# Patient Record
Sex: FEMALE | Race: BLACK OR AFRICAN AMERICAN | NOT HISPANIC OR LATINO | URBAN - METROPOLITAN AREA
[De-identification: names, ages, dates, MRNs, and addresses within clinical notes are randomized per-mention and may not be internally consistent; named-entity substitution may affect disease eponyms.]

---

## 2019-11-04 ENCOUNTER — OFFICE VISIT (OUTPATIENT)
Dept: UROLOGY | Facility: CLINIC | Age: 64
End: 2019-11-04
Payer: MEDICARE

## 2019-11-04 VITALS
HEIGHT: 65 IN | BODY MASS INDEX: 29.49 KG/M2 | WEIGHT: 177 LBS | SYSTOLIC BLOOD PRESSURE: 109 MMHG | HEART RATE: 96 BPM | DIASTOLIC BLOOD PRESSURE: 69 MMHG | RESPIRATION RATE: 18 BRPM

## 2019-11-04 DIAGNOSIS — R82.71 ASYMPTOMATIC BACTERIURIA: ICD-10-CM

## 2019-11-04 DIAGNOSIS — N31.9 NEUROGENIC BLADDER: Primary | ICD-10-CM

## 2019-11-04 PROCEDURE — 3008F BODY MASS INDEX DOCD: CPT | Mod: CPTII,S$GLB,, | Performed by: SPECIALIST

## 2019-11-04 PROCEDURE — 3008F PR BODY MASS INDEX (BMI) DOCUMENTED: ICD-10-PCS | Mod: CPTII,S$GLB,, | Performed by: SPECIALIST

## 2019-11-04 PROCEDURE — 99213 OFFICE O/P EST LOW 20 MIN: CPT | Mod: S$GLB,,, | Performed by: SPECIALIST

## 2019-11-04 PROCEDURE — 1101F PR PT FALLS ASSESS DOC 0-1 FALLS W/OUT INJ PAST YR: ICD-10-PCS | Mod: CPTII,S$GLB,, | Performed by: SPECIALIST

## 2019-11-04 PROCEDURE — 99213 PR OFFICE/OUTPT VISIT, EST, LEVL III, 20-29 MIN: ICD-10-PCS | Mod: S$GLB,,, | Performed by: SPECIALIST

## 2019-11-04 PROCEDURE — 1101F PT FALLS ASSESS-DOCD LE1/YR: CPT | Mod: CPTII,S$GLB,, | Performed by: SPECIALIST

## 2019-11-04 RX ORDER — CYCLOBENZAPRINE HCL 10 MG
10 TABLET ORAL NIGHTLY PRN
Refills: 5 | COMMUNITY
Start: 2019-09-04 | End: 2020-11-05

## 2019-11-04 RX ORDER — IBUPROFEN 600 MG/1
600 TABLET ORAL EVERY 6 HOURS PRN
COMMUNITY
End: 2021-05-12

## 2019-11-04 RX ORDER — MONTELUKAST SODIUM 10 MG/1
10 TABLET ORAL NIGHTLY
COMMUNITY

## 2019-11-04 RX ORDER — METFORMIN HYDROCHLORIDE 500 MG/1
500 TABLET ORAL DAILY
Refills: 1 | COMMUNITY
Start: 2019-09-04

## 2019-11-04 RX ORDER — NAPROXEN SODIUM 220 MG/1
81 TABLET, FILM COATED ORAL DAILY
COMMUNITY

## 2019-11-04 RX ORDER — UBIDECARENONE 30 MG
30 CAPSULE ORAL 3 TIMES DAILY
COMMUNITY

## 2019-11-04 RX ORDER — SERTRALINE HYDROCHLORIDE 50 MG/1
50 TABLET, FILM COATED ORAL DAILY
COMMUNITY

## 2019-11-04 RX ORDER — MELOXICAM 7.5 MG/1
7.5 TABLET ORAL DAILY
COMMUNITY
End: 2021-05-12

## 2019-11-04 NOTE — ASSESSMENT & PLAN NOTE
Patient with neurogenic bladder from prolonged diabetes doing clean intermittent catheterization with monitoring every 6 months

## 2019-11-04 NOTE — PROGRESS NOTES
Subjective:       Patient ID: Lady Sargent is a 64 y.o. female.    Chief Complaint: Other (6mth f/u)      HPI:  64-year-old female with diabetics.  The she has a very high capacity bladder.  She is not able to empty on on the cord.  Would recommend clean intermittent catheterization and she has been doing it as much as possible.  She gets out large amounts of volumes.    She has had a history of gross hematuria we did a workup and was negative.  Recently she called me reporting that she had blood in her urine again.  When I questioned her further seems like she had held her bladder and was it was distended to maximum.    She has asymptomatic bacteriuria as expected from stone was not emptying her bladder very well as well as some was using intermittent catheters.    She has a history of prolapse that we repaired a few years ago is no evidence of recurrence.    Past Medical History:   Past Medical History:   Diagnosis Date    Diabetes mellitus     Hypotension        Past Surgical Historical:   Past Surgical History:   Procedure Laterality Date    HYSTERECTOMY      complete        Medications:   Medication List with Changes/Refills   Current Medications    ASPIRIN 81 MG CHEW    Take 81 mg by mouth once daily.    CO-ENZYME Q-10 30 MG CAPSULE    Take 30 mg by mouth 3 (three) times daily.    CYCLOBENZAPRINE (FLEXERIL) 10 MG TABLET    Take 10 mg by mouth nightly as needed.    IBUPROFEN (ADVIL,MOTRIN) 600 MG TABLET    Take 600 mg by mouth every 6 (six) hours as needed for Pain.    MELOXICAM (MOBIC) 7.5 MG TABLET    Take 7.5 mg by mouth once daily.    METFORMIN (GLUCOPHAGE) 500 MG TABLET    Take 500 mg by mouth 2 (two) times daily.    MONTELUKAST (SINGULAIR) 10 MG TABLET    Take 10 mg by mouth every evening.    MULTIVITAMIN CAPSULE    Take 1 capsule by mouth once daily.    SERTRALINE (ZOLOFT) 50 MG TABLET    Take 50 mg by mouth once daily.        Past Social History:   Social History     Socioeconomic History     Marital status:      Spouse name: Not on file    Number of children: Not on file    Years of education: Not on file    Highest education level: Not on file   Occupational History    Not on file   Social Needs    Financial resource strain: Not on file    Food insecurity:     Worry: Not on file     Inability: Not on file    Transportation needs:     Medical: Not on file     Non-medical: Not on file   Tobacco Use    Smoking status: Never Smoker    Smokeless tobacco: Never Used   Substance and Sexual Activity    Alcohol use: Not Currently    Drug use: Never    Sexual activity: Not on file   Lifestyle    Physical activity:     Days per week: Not on file     Minutes per session: Not on file    Stress: Not on file   Relationships    Social connections:     Talks on phone: Not on file     Gets together: Not on file     Attends Gnosticism service: Not on file     Active member of club or organization: Not on file     Attends meetings of clubs or organizations: Not on file     Relationship status: Not on file   Other Topics Concern    Not on file   Social History Narrative    Not on file       Allergies: Review of patient's allergies indicates:  No Known Allergies     Family History:   Family History   Problem Relation Age of Onset    No Known Problems Father     No Known Problems Mother     Cancer Maternal Aunt     Bone cancer Maternal Aunt     Cancer Maternal Grandmother     Bone cancer Maternal Grandmother         Review of Systems:   systems reviewed and notable for neurogenic bladder symptoms  All other systems were reviewed Neg except as stated in the HPI      Physical Exam:  General: A&Ox3. No apparent distress. No deformities.  Neck: No masses. Normal thyroid.  Lungs: normal inspiration. No use of accessory muscles.  Heart: normal pulse. No arrhythmias.  Abdomen: Soft. NT. ND. No masses. No hernias. No hepatosplenomegaly.  Lymphatic: Neck and groin nodes negative.  Skin: The skin is  warm and dry. No jaundice.  Neurology: Cranial nerves 2-12 crossly intact, no focal weaknesses, no sensation deficits, no motor deficits  Ext: No clubbing, cyanosis or edema.  : deferred        Assessment/Plan:       64-year-old female with neurogenic bladder from diabetics cystopathy as well as asymptomatic bacteriuria.    1.  Continue intermittent self catheterization.  She has a steady supply of catheters at home  2.  Return to see us in clinic in 6 months    Problem List Items Addressed This Visit        Renal/    Neurogenic bladder - Primary    Current Assessment & Plan     Patient with neurogenic bladder from prolonged diabetes doing clean intermittent catheterization with monitoring every 6 months         Asymptomatic bacteriuria    Current Assessment & Plan     Persistent asymptomatic bacteriuria continue to observe.

## 2020-05-04 ENCOUNTER — OFFICE VISIT (OUTPATIENT)
Dept: UROLOGY | Facility: CLINIC | Age: 65
End: 2020-05-04
Payer: MEDICARE

## 2020-05-04 VITALS
DIASTOLIC BLOOD PRESSURE: 68 MMHG | HEIGHT: 65 IN | BODY MASS INDEX: 28.99 KG/M2 | SYSTOLIC BLOOD PRESSURE: 117 MMHG | WEIGHT: 174 LBS | HEART RATE: 83 BPM

## 2020-05-04 DIAGNOSIS — N31.9 NEUROMUSCULAR DYSFUNCTION OF BLADDER: Primary | ICD-10-CM

## 2020-05-04 PROCEDURE — 1101F PR PT FALLS ASSESS DOC 0-1 FALLS W/OUT INJ PAST YR: ICD-10-PCS | Mod: CPTII,S$GLB,, | Performed by: SPECIALIST

## 2020-05-04 PROCEDURE — 3008F BODY MASS INDEX DOCD: CPT | Mod: CPTII,S$GLB,, | Performed by: SPECIALIST

## 2020-05-04 PROCEDURE — 99213 PR OFFICE/OUTPT VISIT, EST, LEVL III, 20-29 MIN: ICD-10-PCS | Mod: S$GLB,,, | Performed by: SPECIALIST

## 2020-05-04 PROCEDURE — 1101F PT FALLS ASSESS-DOCD LE1/YR: CPT | Mod: CPTII,S$GLB,, | Performed by: SPECIALIST

## 2020-05-04 PROCEDURE — 3008F PR BODY MASS INDEX (BMI) DOCUMENTED: ICD-10-PCS | Mod: CPTII,S$GLB,, | Performed by: SPECIALIST

## 2020-05-04 PROCEDURE — 99213 OFFICE O/P EST LOW 20 MIN: CPT | Mod: S$GLB,,, | Performed by: SPECIALIST

## 2020-05-04 RX ORDER — HYDROCODONE BITARTRATE AND ACETAMINOPHEN 5; 325 MG/1; MG/1
TABLET ORAL
COMMUNITY
Start: 2020-02-24 | End: 2021-05-12

## 2020-05-04 NOTE — PROGRESS NOTES
Subjective:       Patient ID: Lady Sargent is a 64 y.o. female.    Chief Complaint: Follow-up      HPI:  64-year-old  female who had very prolonged diabetes with consequent sure effects including neuromuscular bladder dysfunction.  When she presented to see me she had severe vaginal prolapse stage IV we took her and did a apical suspension to the sacral spinous ligaments.  She has been managing her bladder with intermittent catheterization as a postvoid residual.  She has been doing this now for almost 4 years.    There was a time when she had gross hematuria we did the evaluation without any abnormal findings.  She has asymptomatic bacteriuria which we manage.  She denies any recurrent prolapse.      Past Medical History:   Past Medical History:   Diagnosis Date    Diabetes mellitus     Hypotension        Past Surgical Historical:   Past Surgical History:   Procedure Laterality Date    HYSTERECTOMY      complete        Medications:   Medication List with Changes/Refills   Current Medications    ASPIRIN 81 MG CHEW    Take 81 mg by mouth once daily.    CO-ENZYME Q-10 30 MG CAPSULE    Take 30 mg by mouth 3 (three) times daily.    CYCLOBENZAPRINE (FLEXERIL) 10 MG TABLET    Take 10 mg by mouth nightly as needed.    HYDROCODONE-ACETAMINOPHEN (NORCO) 5-325 MG PER TABLET        IBUPROFEN (ADVIL,MOTRIN) 600 MG TABLET    Take 600 mg by mouth every 6 (six) hours as needed for Pain.    MELOXICAM (MOBIC) 7.5 MG TABLET    Take 7.5 mg by mouth once daily.    METFORMIN (GLUCOPHAGE) 500 MG TABLET    Take 500 mg by mouth 2 (two) times daily.    MONTELUKAST (SINGULAIR) 10 MG TABLET    Take 10 mg by mouth every evening.    MULTIVITAMIN CAPSULE    Take 1 capsule by mouth once daily.    SERTRALINE (ZOLOFT) 50 MG TABLET    Take 50 mg by mouth once daily.        Past Social History:   Social History     Socioeconomic History    Marital status:      Spouse name: Not on file    Number of children: Not on  file    Years of education: Not on file    Highest education level: Not on file   Occupational History    Not on file   Social Needs    Financial resource strain: Not on file    Food insecurity:     Worry: Not on file     Inability: Not on file    Transportation needs:     Medical: Not on file     Non-medical: Not on file   Tobacco Use    Smoking status: Never Smoker    Smokeless tobacco: Never Used   Substance and Sexual Activity    Alcohol use: Not Currently    Drug use: Never    Sexual activity: Not on file   Lifestyle    Physical activity:     Days per week: Not on file     Minutes per session: Not on file    Stress: Not on file   Relationships    Social connections:     Talks on phone: Not on file     Gets together: Not on file     Attends Holiness service: Not on file     Active member of club or organization: Not on file     Attends meetings of clubs or organizations: Not on file     Relationship status: Not on file   Other Topics Concern    Not on file   Social History Narrative    Not on file       Allergies: Review of patient's allergies indicates:  No Known Allergies     Family History:   Family History   Problem Relation Age of Onset    No Known Problems Father     No Known Problems Mother     Cancer Maternal Aunt     Bone cancer Maternal Aunt     Cancer Maternal Grandmother     Bone cancer Maternal Grandmother         Review of Systems:   systems reviewed and notable for neuromuscular bladder dysfunction with incomplete bladder emptying  All other systems were reviewed Neg except as stated in the HPI    Physical Exam:  General: A&Ox3. No apparent distress. No deformities.  Neck: No masses. Normal thyroid.  Lungs: normal inspiration. No use of accessory muscles.  Heart: normal pulse. No arrhythmias.  Abdomen: Soft. NT. ND. No masses. No hernias. No hepatosplenomegaly.  Lymphatic: Neck and groin nodes negative.  Skin: The skin is warm and dry. No jaundice.  Neurology: Cranial  nerves 2-12 crossly intact, no focal weaknesses, no sensation deficits, no motor deficits  Ext: No clubbing, cyanosis or edema.  :  Deferred    Assessment/Plan:       64-year-old female with neuromuscular bladder dysfunction was doing clean intermittent catheterization.    1.  She is doing very well continue intermittent catheterization as a postvoid residual.  2.  Asymptomatic bacteria will continue to be observed  3.  She wants to hold off on supplies coming from the catheter company until such a time when she needs some.  She has ample amount of supplies and she does not want to be accumulating the catheters  4.  She will see us back in clinic in 6 months    Problem List Items Addressed This Visit     None      Visit Diagnoses     Neuromuscular dysfunction of bladder    -  Primary

## 2020-05-05 ENCOUNTER — TELEPHONE (OUTPATIENT)
Dept: UROLOGY | Facility: CLINIC | Age: 65
End: 2020-05-05

## 2020-05-05 NOTE — TELEPHONE ENCOUNTER
Spoke w/ Helene w/ [employer] and requested, per pt., that supply delivery be put on hold. Per Helene, she states that supplies have been on hold since 12/2019.    Pt was informed and verbalized understanding.

## 2020-11-05 ENCOUNTER — OFFICE VISIT (OUTPATIENT)
Dept: UROLOGY | Facility: CLINIC | Age: 65
End: 2020-11-05
Payer: MEDICARE

## 2020-11-05 VITALS
SYSTOLIC BLOOD PRESSURE: 112 MMHG | WEIGHT: 174 LBS | DIASTOLIC BLOOD PRESSURE: 53 MMHG | BODY MASS INDEX: 28.99 KG/M2 | HEIGHT: 65 IN | HEART RATE: 80 BPM

## 2020-11-05 DIAGNOSIS — Z78.9 INTERMITTENT SELF-CATHETERIZATION OF BLADDER: ICD-10-CM

## 2020-11-05 DIAGNOSIS — N31.9 NEUROMUSCULAR DYSFUNCTION OF BLADDER: Primary | ICD-10-CM

## 2020-11-05 PROCEDURE — 99213 OFFICE O/P EST LOW 20 MIN: CPT | Mod: S$GLB,,, | Performed by: SPECIALIST

## 2020-11-05 PROCEDURE — 3008F PR BODY MASS INDEX (BMI) DOCUMENTED: ICD-10-PCS | Mod: CPTII,S$GLB,, | Performed by: SPECIALIST

## 2020-11-05 PROCEDURE — 1159F PR MEDICATION LIST DOCUMENTED IN MEDICAL RECORD: ICD-10-PCS | Mod: S$GLB,,, | Performed by: SPECIALIST

## 2020-11-05 PROCEDURE — 3008F BODY MASS INDEX DOCD: CPT | Mod: CPTII,S$GLB,, | Performed by: SPECIALIST

## 2020-11-05 PROCEDURE — 99213 PR OFFICE/OUTPT VISIT, EST, LEVL III, 20-29 MIN: ICD-10-PCS | Mod: S$GLB,,, | Performed by: SPECIALIST

## 2020-11-05 PROCEDURE — 1159F MED LIST DOCD IN RCRD: CPT | Mod: S$GLB,,, | Performed by: SPECIALIST

## 2020-11-05 RX ORDER — CLINDAMYCIN HYDROCHLORIDE 150 MG/1
CAPSULE ORAL
COMMUNITY
Start: 2020-09-15 | End: 2021-05-12

## 2020-11-05 RX ORDER — INFLUENZA A VIRUS A/MICHIGAN/45/2015 X-275 (H1N1) ANTIGEN (FORMALDEHYDE INACTIVATED), INFLUENZA A VIRUS A/SINGAPORE/INFIMH-16-0019/2016 IVR-186 (H3N2) ANTIGEN (FORMALDEHYDE INACTIVATED), INFLUENZA B VIRUS B/PHUKET/3073/2013 ANTIGEN (FORMALDEHYDE INACTIVATED), AND INFLUENZA B VIRUS B/MARYLAND/15/2016 BX-69A ANTIGEN (FORMALDEHYDE INACTIVATED) 60; 60; 60; 60 UG/.7ML; UG/.7ML; UG/.7ML; UG/.7ML
INJECTION, SUSPENSION INTRAMUSCULAR
COMMUNITY
Start: 2020-10-27 | End: 2021-05-12

## 2020-11-05 NOTE — PROGRESS NOTES
Patient seen and examined.  Clinical data reviewed.  Case discussed with the nurse practitioner.  I agree with her assessment and plan.    Briefly this is a 65-year-old female neuromuscular bladder dysfunction.  She underwent a prolapse repair multiple years ago.  She is currently using intermittent catheterizations for bladder emptying.  She is doing very well.  She has had a few instances of gross hematuria with done a workup without any abnormal findings.  The plan today will be to continue to monitor her move appointments to once every year.  She has her catheter supplies coming directly to her home.

## 2020-11-05 NOTE — PROGRESS NOTES
Chief Complaint:   Chief Complaint   Patient presents with    Follow-up     neuromuscular dysfunction of bladder       HPI:  65-year-old  female known to the service of Dr. Granados who presents for six-month follow-up neuromuscular bladder dysfunction.  She has prolonged diabetes with consequential affects including neuromuscular bladder dysfunction.  When 1st evaluated by Urology she was found to have a severe vaginal prolapse stage IV so she was taken to the OR for an afocal suspension to the sacral spinous ligaments.  She has been managing her bladder with intermittent catheterization as a postvoid residual for almost 5 years now.  She is able to perform the intervention without difficulty, denies need for refill on supplies.  Last imaging study was performed in June 2018.    She has a history of gross hematuria with a negative workup.  She also has asymptomatic bacteriuria which we monitor.  She denies any recurrent prolapse.  No burning with urination, hematuria, abdominal pain, pelvic pain, flank pain, or any other urological complaints.    Allergies:  Patient has no known allergies.    Medications: has a current medication list which includes the following prescription(s): aspirin, clindamycin, co-enzyme q-10, fluzone highdose quad 20-21 pf, hydrocodone-acetaminophen, ibuprofen, meloxicam, metformin, montelukast, multivitamin, and sertraline.    Review of Systems:  General: No fever, chills, vision changes, dizziness, weakness, fatigue, unexplained weight loss, confusion, or mood swings.  Skin: No rashes, itching, or changes in color/texture of skin.  Chest: Denies CUETO, cyanosis, wheezing, cough, and sputum production.  Abdomen: Appetite fine. Denies diarrhea, abdominal pain, hematemesis, or blood in stool.  Musculoskeletal: No joint stiffness or swelling. No painful lymph nodes  : As above.  All other review of systems negative.    PMH:   has a past medical history of Diabetes mellitus and  Hypotension.    PSH:   has a past surgical history that includes Hysterectomy.    FamHx: family history includes Bone cancer in her maternal aunt and maternal grandmother; Cancer in her maternal aunt and maternal grandmother; No Known Problems in her father and mother.    SocHx:  reports that she has never smoked. She has never used smokeless tobacco. She reports previous alcohol use. She reports that she does not use drugs.      Physical Exam:  Vitals:    11/05/20 1353   BP: (!) 112/53   Pulse: 80     General: AAOx3, no apparent distress, no deformities  Neck: supple, no masses, normal thyroid, full ROM  Lungs: CTAB, no adventitious breath sounds, normal inspiration, no use of accessory muscles  Heart: regular rate and rhythm, no arrhythmias  Abdomen: soft, NT, ND, no masses, no hernias, no hepatosplenomegaly  Lymphatic: no unusually enlarged or tender lymph nodes  Skin: warm and dry, no jaundice, no rash  Ext: without edema or deformity, COATS, ambulates independently  : deferred    Labs/Studies: none    Impression/Plan:   Neuromuscular dysfunction of bladder  Comments:  due to diabetes, performs self CIC indefinitely, no complaints today    Intermittent self-catheterization of bladder  Comments:  stable, due to NMBD, denies need for refill on supplies        Follow up in about 1 year (around 11/5/2021).

## 2021-05-12 ENCOUNTER — OFFICE VISIT (OUTPATIENT)
Dept: UROLOGY | Facility: CLINIC | Age: 66
End: 2021-05-12
Payer: MEDICARE

## 2021-05-12 VITALS
HEART RATE: 108 BPM | SYSTOLIC BLOOD PRESSURE: 99 MMHG | DIASTOLIC BLOOD PRESSURE: 66 MMHG | BODY MASS INDEX: 28.96 KG/M2 | WEIGHT: 174 LBS

## 2021-05-12 DIAGNOSIS — R31.0 GROSS HEMATURIA: ICD-10-CM

## 2021-05-12 DIAGNOSIS — R30.0 DYSURIA: Primary | ICD-10-CM

## 2021-05-12 DIAGNOSIS — N31.9 NEUROMUSCULAR DYSFUNCTION OF BLADDER: ICD-10-CM

## 2021-05-12 DIAGNOSIS — R10.9 BILATERAL FLANK PAIN: ICD-10-CM

## 2021-05-12 DIAGNOSIS — Z78.9 INTERMITTENT SELF-CATHETERIZATION OF BLADDER: ICD-10-CM

## 2021-05-12 PROCEDURE — 99214 PR OFFICE/OUTPT VISIT, EST, LEVL IV, 30-39 MIN: ICD-10-PCS | Mod: 25,S$GLB,, | Performed by: NURSE PRACTITIONER

## 2021-05-12 PROCEDURE — 1125F PR PAIN SEVERITY QUANTIFIED, PAIN PRESENT: ICD-10-PCS | Mod: S$GLB,,, | Performed by: NURSE PRACTITIONER

## 2021-05-12 PROCEDURE — 81001 PR  URINALYSIS, AUTO, W/SCOPE: ICD-10-PCS | Mod: S$GLB,,, | Performed by: NURSE PRACTITIONER

## 2021-05-12 PROCEDURE — 3008F BODY MASS INDEX DOCD: CPT | Mod: CPTII,S$GLB,, | Performed by: NURSE PRACTITIONER

## 2021-05-12 PROCEDURE — 1125F AMNT PAIN NOTED PAIN PRSNT: CPT | Mod: S$GLB,,, | Performed by: NURSE PRACTITIONER

## 2021-05-12 PROCEDURE — 99214 OFFICE O/P EST MOD 30 MIN: CPT | Mod: 25,S$GLB,, | Performed by: NURSE PRACTITIONER

## 2021-05-12 PROCEDURE — 36415 PR COLLECTION VENOUS BLOOD,VENIPUNCTURE: ICD-10-PCS | Mod: S$GLB,,, | Performed by: NURSE PRACTITIONER

## 2021-05-12 PROCEDURE — 1159F PR MEDICATION LIST DOCUMENTED IN MEDICAL RECORD: ICD-10-PCS | Mod: S$GLB,,, | Performed by: NURSE PRACTITIONER

## 2021-05-12 PROCEDURE — 1159F MED LIST DOCD IN RCRD: CPT | Mod: S$GLB,,, | Performed by: NURSE PRACTITIONER

## 2021-05-12 PROCEDURE — 3008F PR BODY MASS INDEX (BMI) DOCUMENTED: ICD-10-PCS | Mod: CPTII,S$GLB,, | Performed by: NURSE PRACTITIONER

## 2021-05-12 PROCEDURE — 36415 COLL VENOUS BLD VENIPUNCTURE: CPT | Mod: S$GLB,,, | Performed by: NURSE PRACTITIONER

## 2021-05-12 PROCEDURE — 81001 URINALYSIS AUTO W/SCOPE: CPT | Mod: S$GLB,,, | Performed by: NURSE PRACTITIONER

## 2021-05-12 RX ORDER — ONDANSETRON 4 MG/1
4 TABLET, ORALLY DISINTEGRATING ORAL EVERY 6 HOURS PRN
Qty: 15 TABLET | Refills: 0 | Status: SHIPPED | OUTPATIENT
Start: 2021-05-12

## 2021-05-12 RX ORDER — PRAVASTATIN SODIUM 40 MG/1
40 TABLET ORAL DAILY
COMMUNITY
Start: 2021-03-15

## 2021-05-12 RX ORDER — KETOROLAC TROMETHAMINE 10 MG/1
10 TABLET, FILM COATED ORAL EVERY 6 HOURS PRN
Qty: 20 TABLET | Refills: 0 | Status: SHIPPED | OUTPATIENT
Start: 2021-05-12 | End: 2021-05-18

## 2021-05-12 RX ORDER — MELOXICAM 15 MG/1
TABLET ORAL
COMMUNITY
Start: 2021-03-08

## 2021-05-13 ENCOUNTER — TELEPHONE (OUTPATIENT)
Dept: UROLOGY | Facility: CLINIC | Age: 66
End: 2021-05-13

## 2021-05-14 ENCOUNTER — TELEPHONE (OUTPATIENT)
Dept: UROLOGY | Facility: CLINIC | Age: 66
End: 2021-05-14

## 2021-05-14 DIAGNOSIS — R30.0 DYSURIA: Primary | ICD-10-CM

## 2021-05-14 LAB — URINE CULTURE, ROUTINE: NORMAL

## 2021-05-14 RX ORDER — AMOXICILLIN AND CLAVULANATE POTASSIUM 875; 125 MG/1; MG/1
1 TABLET, FILM COATED ORAL 2 TIMES DAILY
Qty: 14 TABLET | Refills: 0 | Status: SHIPPED | OUTPATIENT
Start: 2021-05-14 | End: 2021-05-21

## 2021-05-18 ENCOUNTER — OUTSIDE PLACE OF SERVICE (OUTPATIENT)
Dept: UROLOGY | Facility: CLINIC | Age: 66
End: 2021-05-18
Payer: MEDICARE

## 2021-05-18 DIAGNOSIS — R31.9 HEMATURIA OF UNKNOWN CAUSE: ICD-10-CM

## 2021-05-18 PROCEDURE — 99214 PR OFFICE/OUTPT VISIT, EST, LEVL IV, 30-39 MIN: ICD-10-PCS | Mod: ,,, | Performed by: SPECIALIST

## 2021-05-18 PROCEDURE — 99214 OFFICE O/P EST MOD 30 MIN: CPT | Mod: ,,, | Performed by: SPECIALIST

## 2021-05-19 ENCOUNTER — OUTSIDE PLACE OF SERVICE (OUTPATIENT)
Dept: UROLOGY | Facility: CLINIC | Age: 66
End: 2021-05-19
Payer: MEDICARE

## 2021-05-19 LAB
GLUCOSE SERPL-MCNC: 102 MG/DL (ref 70–105)
GLUCOSE SERPL-MCNC: 116 MG/DL (ref 70–105)

## 2021-05-19 PROCEDURE — 74420 UROGRAPHY RTRGR +-KUB: CPT | Mod: 26,,, | Performed by: SPECIALIST

## 2021-05-19 PROCEDURE — 53899: ICD-10-PCS | Mod: ,,, | Performed by: SPECIALIST

## 2021-05-19 PROCEDURE — 52356 PR CYSTO/URETERO W/LITHOTRIPSY: ICD-10-PCS | Mod: RT,,, | Performed by: SPECIALIST

## 2021-05-19 PROCEDURE — 74420 PR  X-RAY RETROGRADE PYELOGRAM: ICD-10-PCS | Mod: 26,,, | Performed by: SPECIALIST

## 2021-05-19 PROCEDURE — 52356 CYSTO/URETERO W/LITHOTRIPSY: CPT | Mod: RT,,, | Performed by: SPECIALIST

## 2021-05-19 PROCEDURE — 53899 UNLISTED PX URINARY SYSTEM: CPT | Mod: ,,, | Performed by: SPECIALIST

## 2021-05-20 ENCOUNTER — TELEPHONE (OUTPATIENT)
Dept: UROLOGY | Facility: CLINIC | Age: 66
End: 2021-05-20

## 2021-05-23 LAB
CALCULI COMPOSITION: NORMAL
CALCULI DESCRIPTION: NORMAL
CALCULI MASS: 65 MG
CALCULI NUMBER: 5
CALCULI PDF: NORMAL
CALCULI SIZE: NORMAL MM

## 2021-05-25 ENCOUNTER — DOCUMENTATION ONLY (OUTPATIENT)
Dept: UROLOGY | Facility: CLINIC | Age: 66
End: 2021-05-25

## 2021-05-26 ENCOUNTER — HOSPITAL ENCOUNTER (OUTPATIENT)
Dept: RADIOLOGY | Facility: CLINIC | Age: 66
Discharge: HOME | End: 2021-05-26
Attending: SPECIALIST
Payer: MEDICARE

## 2021-05-26 ENCOUNTER — PROCEDURE VISIT (OUTPATIENT)
Dept: UROLOGY | Facility: CLINIC | Age: 66
End: 2021-05-26
Payer: MEDICARE

## 2021-05-26 VITALS — HEIGHT: 66 IN | BODY MASS INDEX: 27.5 KG/M2 | WEIGHT: 171.13 LBS

## 2021-05-26 DIAGNOSIS — R30.0 DYSURIA: ICD-10-CM

## 2021-05-26 DIAGNOSIS — Z46.6 ENCOUNTER FOR REMOVAL OF URETERAL STENT: Primary | ICD-10-CM

## 2021-05-26 PROCEDURE — 52310 CYSTOSCOPY AND TREATMENT: CPT | Mod: S$GLB,,, | Performed by: SPECIALIST

## 2021-05-26 PROCEDURE — 74018 XR ABDOMEN AP 1 VIEW: ICD-10-PCS | Mod: 26,,, | Performed by: RADIOLOGY

## 2021-05-26 PROCEDURE — 52310 CYSTOSCOPY: ICD-10-PCS | Mod: S$GLB,,, | Performed by: SPECIALIST

## 2021-05-26 PROCEDURE — 74018 RADEX ABDOMEN 1 VIEW: CPT | Mod: TC,,, | Performed by: SPECIALIST

## 2021-05-26 PROCEDURE — 74018 RADEX ABDOMEN 1 VIEW: CPT | Mod: 26,,, | Performed by: RADIOLOGY

## 2021-05-26 PROCEDURE — 74018 XR ABDOMEN AP 1 VIEW: ICD-10-PCS | Mod: TC,,, | Performed by: SPECIALIST

## 2021-07-13 ENCOUNTER — DOCUMENTATION ONLY (OUTPATIENT)
Dept: UROLOGY | Facility: CLINIC | Age: 66
End: 2021-07-13

## 2021-12-01 ENCOUNTER — OFFICE VISIT (OUTPATIENT)
Dept: UROLOGY | Facility: CLINIC | Age: 66
End: 2021-12-01
Payer: MEDICARE

## 2021-12-01 VITALS
SYSTOLIC BLOOD PRESSURE: 133 MMHG | BODY MASS INDEX: 28.41 KG/M2 | WEIGHT: 176 LBS | RESPIRATION RATE: 16 BRPM | DIASTOLIC BLOOD PRESSURE: 84 MMHG | HEART RATE: 87 BPM

## 2021-12-01 DIAGNOSIS — N31.9 NEUROMUSCULAR DYSFUNCTION OF BLADDER: Primary | ICD-10-CM

## 2021-12-01 DIAGNOSIS — R31.0 GROSS HEMATURIA: ICD-10-CM

## 2021-12-01 DIAGNOSIS — Z78.9 INTERMITTENT SELF-CATHETERIZATION OF BLADDER: ICD-10-CM

## 2021-12-01 PROCEDURE — 99214 PR OFFICE/OUTPT VISIT, EST, LEVL IV, 30-39 MIN: ICD-10-PCS | Mod: S$GLB,,, | Performed by: NURSE PRACTITIONER

## 2021-12-01 PROCEDURE — 99214 OFFICE O/P EST MOD 30 MIN: CPT | Mod: S$GLB,,, | Performed by: NURSE PRACTITIONER

## 2021-12-01 PROCEDURE — 81001 PR  URINALYSIS, AUTO, W/SCOPE: ICD-10-PCS | Mod: S$GLB,,, | Performed by: NURSE PRACTITIONER

## 2021-12-01 PROCEDURE — 81001 URINALYSIS AUTO W/SCOPE: CPT | Mod: S$GLB,,, | Performed by: NURSE PRACTITIONER

## 2022-01-25 ENCOUNTER — OFFICE VISIT (OUTPATIENT)
Dept: UROLOGY | Facility: CLINIC | Age: 67
End: 2022-01-25
Payer: MEDICARE

## 2022-01-25 VITALS — SYSTOLIC BLOOD PRESSURE: 131 MMHG | RESPIRATION RATE: 16 BRPM | DIASTOLIC BLOOD PRESSURE: 78 MMHG

## 2022-01-25 DIAGNOSIS — R10.9 BILATERAL FLANK PAIN: ICD-10-CM

## 2022-01-25 DIAGNOSIS — N31.9 NEUROMUSCULAR DYSFUNCTION OF BLADDER: ICD-10-CM

## 2022-01-25 DIAGNOSIS — Z78.9 INTERMITTENT SELF-CATHETERIZATION OF BLADDER: ICD-10-CM

## 2022-01-25 DIAGNOSIS — R31.0 GROSS HEMATURIA: Primary | ICD-10-CM

## 2022-01-25 PROCEDURE — 81001 PR  URINALYSIS, AUTO, W/SCOPE: ICD-10-PCS | Mod: S$GLB,,, | Performed by: NURSE PRACTITIONER

## 2022-01-25 PROCEDURE — 99213 PR OFFICE/OUTPT VISIT, EST, LEVL III, 20-29 MIN: ICD-10-PCS | Mod: S$GLB,,, | Performed by: NURSE PRACTITIONER

## 2022-01-25 PROCEDURE — 81001 URINALYSIS AUTO W/SCOPE: CPT | Mod: S$GLB,,, | Performed by: NURSE PRACTITIONER

## 2022-01-25 PROCEDURE — 99213 OFFICE O/P EST LOW 20 MIN: CPT | Mod: S$GLB,,, | Performed by: NURSE PRACTITIONER

## 2022-01-25 NOTE — PROGRESS NOTES
Chief Complaint:   Chief Complaint   Patient presents with    Hematuria       HPI:  66-year-old  female who presents with complaints of gross hematuria. She has a history of gross hematuria with full workup in 2018 with Dr. Granados. She also has a history of kidney stones with last intervention in 5/2021. She reports mild, intermittent, non-radiating bilateral flank pain.     She does have a history of severe vaginal prolapse stage IV so she underwent apical suspension to the sacral spinous ligaments with Dr. Granados. She denies any recurrent prolapse.     She has neuromuscular bladder dysfunction due to prolonged diabetes. She is maintained on tamsulosin as she also voids independently. She has been managing her bladder with intermittent catheterization as a postvoid residual for almost 5 years now.  She is able to perform the intervention without difficulty, denies need for refill on supplies. She gets her supplies from [facility]. She has asymptomatic bacteriuria which we monitor.     No other urinary complaints expressed.    Allergies:  Latex, natural rubber    Medications: has a current medication list which includes the following prescription(s): aspirin, benzonatate, co-enzyme q-10, meloxicam, metformin, montelukast, multivitamin, ondansetron, pravastatin, sertraline, and tamsulosin.    Review of Systems:  General: No fever, chills, vision changes, dizziness, weakness, fatigue, unexplained weight loss, confusion, or mood swings.  Skin: No rashes, itching, or changes in color/texture of skin.  Chest: Denies CUETO, cyanosis, wheezing, cough, and sputum production.  Abdomen: Appetite fine. Denies diarrhea, abdominal pain, hematemesis, or blood in stool.  Musculoskeletal: No joint stiffness or swelling. No painful lymph nodes  : As above.  All other review of systems negative.    PMH:   has a past medical history of Diabetes mellitus and Hypotension.    PSH:   has a past surgical history that  includes Hysterectomy.    FamHx: family history includes Bone cancer in her maternal aunt and maternal grandmother; Cancer in her maternal aunt and maternal grandmother; No Known Problems in her father and mother.    SocHx:  reports that she has never smoked. She has never used smokeless tobacco. She reports previous alcohol use. She reports that she does not use drugs.      Physical Exam:  Vitals:    01/25/22 1519   BP: 131/78   Resp: 16     General: AAOx3, no apparent distress, no deformities  Neck: supple, no masses, normal thyroid, full ROM  Lungs: CTAB, no adventitious breath sounds, normal inspiration, no use of accessory muscles  Heart: regular rate and rhythm, no arrhythmias  Abdomen: soft, NT, ND, no masses, no hernias, no hepatosplenomegaly  Lymphatic: no unusually enlarged or tender lymph nodes  Skin: warm and dry, no jaundice, no rash  Ext: without edema or deformity, COATS, ambulates independently  : deferred    Labs/Studies: UA voided sample shows nitrites +, WBCs TNTC/hpf, RBCs TNTC/hpf, epi 2+, bact 4+    Impression/Plan:   Gross hematuria  Comments:  started 1/24, UA today shows RBCs TNTC/hpf, plan for workup with CT urogram and cystoscopy  Orders:  -     POCT Urinalysis (w/Micro Option)  -     CT Urogram Abd Pelvis W WO; Future; Expected date: 01/25/2022  -     Creatinine, serum; Future; Expected date: 01/25/2022  -     Urine culture    Bilateral flank pain  Comments:  with history of renal stones, eval further with CT urogram    Neuromuscular dysfunction of bladder  Comments:  due to history of diabetes, managed with tamsulosin and CIC    Intermittent self-catheterization of bladder  Comments:  will perform indefinitely, denies need for refill on supplies        Follow up for TBD p review of CT imaging, last cystoscopy for hematuria workup in 2018.

## 2022-01-26 RX ORDER — BENZONATATE 100 MG/1
CAPSULE ORAL
COMMUNITY
Start: 2021-08-10

## 2022-01-27 ENCOUNTER — TELEPHONE (OUTPATIENT)
Dept: UROLOGY | Facility: CLINIC | Age: 67
End: 2022-01-27
Payer: MEDICARE

## 2022-01-27 LAB — URINE CULTURE, ROUTINE: NORMAL

## 2022-01-27 RX ORDER — NITROFURANTOIN 25; 75 MG/1; MG/1
100 CAPSULE ORAL 2 TIMES DAILY
Qty: 14 CAPSULE | Refills: 0 | Status: SHIPPED | OUTPATIENT
Start: 2022-01-27 | End: 2022-02-03

## 2022-01-27 NOTE — TELEPHONE ENCOUNTER
Contacted pt, advised of results. Pt verbalized understanding. BJP    ----- Message from Tammy Trammell NP sent at 1/27/2022  9:21 AM CST -----  Urine culture positive, please call patient and let her know that I have sent refill on medication to the pharmacy

## 2022-02-01 ENCOUNTER — TELEPHONE (OUTPATIENT)
Dept: UROLOGY | Facility: CLINIC | Age: 67
End: 2022-02-01
Payer: MEDICARE

## 2022-02-01 DIAGNOSIS — R31.0 GROSS HEMATURIA: Primary | ICD-10-CM

## 2022-02-01 LAB
CREAT SERPL-MCNC: 1.01 MG/DL (ref 0.55–1.02)
GFR ESTIMATION: > 60

## 2022-02-01 NOTE — TELEPHONE ENCOUNTER
Contacted pt, advised of results, and the  will be contacting her to scheduled Cysto. Pt verbalized understanding. BJP    ----- Message from Tammy Trammell NP sent at 2/1/2022  9:54 AM CST -----  CT imaging shows no cause for gross hematuria, please call patient and let her know that cystoscopy has been ordered for further evaluation.

## 2022-02-09 ENCOUNTER — TELEPHONE (OUTPATIENT)
Dept: UROLOGY | Facility: CLINIC | Age: 67
End: 2022-02-09
Payer: MEDICARE

## 2022-02-10 ENCOUNTER — PROCEDURE VISIT (OUTPATIENT)
Dept: UROLOGY | Facility: CLINIC | Age: 67
End: 2022-02-10
Payer: MEDICARE

## 2022-02-10 VITALS
DIASTOLIC BLOOD PRESSURE: 67 MMHG | BODY MASS INDEX: 29.83 KG/M2 | HEART RATE: 74 BPM | WEIGHT: 179.06 LBS | RESPIRATION RATE: 20 BRPM | OXYGEN SATURATION: 100 % | SYSTOLIC BLOOD PRESSURE: 107 MMHG | HEIGHT: 65 IN

## 2022-02-10 DIAGNOSIS — R31.0 GROSS HEMATURIA: ICD-10-CM

## 2022-02-10 PROCEDURE — 52000 CYSTOSCOPY: ICD-10-PCS | Mod: S$GLB,,, | Performed by: UROLOGY

## 2022-02-10 PROCEDURE — 52000 CYSTOURETHROSCOPY: CPT | Mod: S$GLB,,, | Performed by: UROLOGY

## 2022-02-10 NOTE — PROCEDURES
"Cystoscopy    Date/Time: 2/10/2022 11:00 AM  Performed by: Rodney Gaviria MD  Authorized by: Tammy Trammell NP     Consent Done?:  Yes (Written)  Time out: Immediately prior to procedure a "time out" was called to verify the correct patient, procedure, equipment, support staff and site/side marked as required.    Indications: hematuria    Position:  Supine  Anesthesia:  Intraurethral instillation  Patient sedated?: No    Preparation: Patient was prepped and draped in usual sterile fashion      Scope type:  Flexible cystoscope  External exam normal: Yes    Urethra normal: Yes       The patient was brought to the procedure room placed on the table padded prepped and draped in usual sterile fashion in supine position. The cystoscope was inserted into the urethra and advanced the urethra was normal. The bladder was entered and inspected, it was found to be free of tumor stone or foreign body.  Bilateral ureteral orifices were identified and noted to be normal in appearance with clear efflux of urine at this point the scope was removed the patient tolerated the procedure well there were no complications.  Pelvic exam was performed and no abnormalities were noted.        "

## 2022-12-02 ENCOUNTER — OFFICE VISIT (OUTPATIENT)
Dept: UROLOGY | Facility: CLINIC | Age: 67
End: 2022-12-02
Payer: MEDICARE

## 2022-12-02 VITALS — HEART RATE: 81 BPM | SYSTOLIC BLOOD PRESSURE: 119 MMHG | DIASTOLIC BLOOD PRESSURE: 74 MMHG

## 2022-12-02 DIAGNOSIS — N31.9 NEUROGENIC BLADDER: Primary | ICD-10-CM

## 2022-12-02 DIAGNOSIS — R31.0 GROSS HEMATURIA: ICD-10-CM

## 2022-12-02 PROCEDURE — 3078F DIAST BP <80 MM HG: CPT | Mod: CPTII,S$GLB,, | Performed by: NURSE PRACTITIONER

## 2022-12-02 PROCEDURE — 1160F PR REVIEW ALL MEDS BY PRESCRIBER/CLIN PHARMACIST DOCUMENTED: ICD-10-PCS | Mod: CPTII,S$GLB,, | Performed by: NURSE PRACTITIONER

## 2022-12-02 PROCEDURE — 1160F RVW MEDS BY RX/DR IN RCRD: CPT | Mod: CPTII,S$GLB,, | Performed by: NURSE PRACTITIONER

## 2022-12-02 PROCEDURE — 3074F SYST BP LT 130 MM HG: CPT | Mod: CPTII,S$GLB,, | Performed by: NURSE PRACTITIONER

## 2022-12-02 PROCEDURE — 3078F PR MOST RECENT DIASTOLIC BLOOD PRESSURE < 80 MM HG: ICD-10-PCS | Mod: CPTII,S$GLB,, | Performed by: NURSE PRACTITIONER

## 2022-12-02 PROCEDURE — 4010F ACE/ARB THERAPY RXD/TAKEN: CPT | Mod: CPTII,S$GLB,, | Performed by: NURSE PRACTITIONER

## 2022-12-02 PROCEDURE — 3074F PR MOST RECENT SYSTOLIC BLOOD PRESSURE < 130 MM HG: ICD-10-PCS | Mod: CPTII,S$GLB,, | Performed by: NURSE PRACTITIONER

## 2022-12-02 PROCEDURE — 1159F PR MEDICATION LIST DOCUMENTED IN MEDICAL RECORD: ICD-10-PCS | Mod: CPTII,S$GLB,, | Performed by: NURSE PRACTITIONER

## 2022-12-02 PROCEDURE — 4010F PR ACE/ARB THEARPY RXD/TAKEN: ICD-10-PCS | Mod: CPTII,S$GLB,, | Performed by: NURSE PRACTITIONER

## 2022-12-02 PROCEDURE — 99213 OFFICE O/P EST LOW 20 MIN: CPT | Mod: S$GLB,,, | Performed by: NURSE PRACTITIONER

## 2022-12-02 PROCEDURE — 1159F MED LIST DOCD IN RCRD: CPT | Mod: CPTII,S$GLB,, | Performed by: NURSE PRACTITIONER

## 2022-12-02 PROCEDURE — 99213 PR OFFICE/OUTPT VISIT, EST, LEVL III, 20-29 MIN: ICD-10-PCS | Mod: S$GLB,,, | Performed by: NURSE PRACTITIONER

## 2022-12-02 NOTE — PROGRESS NOTES
Subjective:       Patient ID: Lady Sargent is a 67 y.o. female.    Chief Complaint: No chief complaint on file.      HPI: 67-year-old female, established patient, last seen February 2022.    Patient has history gross hematuria.    She is had 2- workups.    Dr. Granados did a cysto in March 2021.  Dr. Gaviria did a cysto in February 2022.    She is also had a negative CT.  At this time she is denying any visible blood in her urine.      Patient has history of neurogenic bladder.  She had been on Flomax.    However, she has stopped Flomax.  States she was instructed to stop Flomax by her PCP due to her diabetes.    She denies any adverse effects since stopping her Flomax.      Patient denies any urinary complaints at this time.       Past Medical History:   Past Medical History:   Diagnosis Date    Diabetes mellitus     Hypotension        Past Surgical Historical:   Past Surgical History:   Procedure Laterality Date    HYSTERECTOMY      complete        Medications:   Medication List with Changes/Refills   Current Medications    ASPIRIN 81 MG CHEW    Take 81 mg by mouth once daily.    BENZONATATE (TESSALON) 100 MG CAPSULE        CO-ENZYME Q-10 30 MG CAPSULE    Take 30 mg by mouth 3 (three) times daily.    MELOXICAM (MOBIC) 15 MG TABLET    TAKE 1 TABLET BY MOUTH ONCE DAILY WITH FOOD AS NEEDED    METFORMIN (GLUCOPHAGE) 500 MG TABLET    Take 500 mg by mouth once daily.    MONTELUKAST (SINGULAIR) 10 MG TABLET    Take 10 mg by mouth every evening.    MULTIVITAMIN CAPSULE    Take 1 capsule by mouth once daily.    ONDANSETRON (ZOFRAN-ODT) 4 MG TBDL    Take 1 tablet (4 mg total) by mouth every 6 (six) hours as needed (nausea/vomiting).    PRAVASTATIN (PRAVACHOL) 40 MG TABLET    Take 40 mg by mouth once daily.    SERTRALINE (ZOLOFT) 50 MG TABLET    Take 50 mg by mouth once daily.    TAMSULOSIN (FLOMAX) 0.4 MG CAP    TAKE 1 CAPSULE BY MOUTH EVERYDAY AT BEDTIME        Past Social History:   Social History     Socioeconomic  History    Marital status:    Tobacco Use    Smoking status: Never    Smokeless tobacco: Never   Substance and Sexual Activity    Alcohol use: Not Currently    Drug use: Never       Allergies:   Review of patient's allergies indicates:   Allergen Reactions    Latex, natural rubber Rash        Family History:   Family History   Problem Relation Age of Onset    No Known Problems Father     No Known Problems Mother     Cancer Maternal Aunt     Bone cancer Maternal Aunt     Cancer Maternal Grandmother     Bone cancer Maternal Grandmother         Review of Systems:  Review of Systems   Constitutional:  Negative for activity change and appetite change.   HENT:  Negative for congestion and dental problem.    Respiratory:  Negative for chest tightness and shortness of breath.    Cardiovascular:  Negative for chest pain.   Gastrointestinal:  Negative for abdominal distention.   Genitourinary:  Negative for decreased urine volume, difficulty urinating, dyspareunia, dysuria, enuresis, flank pain, frequency, genital sores, hematuria, pelvic pain and urgency.   Musculoskeletal:  Negative for back pain and neck pain.   Allergic/Immunologic: Negative for immunocompromised state.   Neurological:  Negative for dizziness.   Hematological:  Negative for adenopathy.   Psychiatric/Behavioral:  Negative for agitation, behavioral problems and confusion.      Physical Exam:  Physical Exam  Vitals and nursing note reviewed.   Constitutional:       Appearance: She is well-developed.   HENT:      Head: Normocephalic.   Cardiovascular:      Rate and Rhythm: Normal rate and regular rhythm.      Heart sounds: Normal heart sounds.   Pulmonary:      Effort: Pulmonary effort is normal.      Breath sounds: Normal breath sounds.   Abdominal:      General: Bowel sounds are normal.      Palpations: Abdomen is soft.   Skin:     General: Skin is warm and dry.   Neurological:      Mental Status: She is alert and oriented to person, place, and  time.     No urine sample provided.    Assessment/Plan:   1. Neurogenic bladder:  Patient has stopped Flomax with no adverse effects.    Patient will continue without Flomax.      2. Gross hematuria:  Patient has had 2- workups.    Denies any visible blood in her urine at this time.      Discussed follow-up in 1 year.    However, patient states that she plans on switching to a new urologist.    Patient will notify us and schedule follow-up if she wants to continue surfaces with [facility].  Problem List Items Addressed This Visit          Renal/    Neurogenic bladder - Primary     Other Visit Diagnoses       Gross hematuria

## 2023-02-14 ENCOUNTER — TELEPHONE (OUTPATIENT)
Dept: UROLOGY | Facility: CLINIC | Age: 68
End: 2023-02-14

## 2023-02-14 ENCOUNTER — CLINICAL SUPPORT (OUTPATIENT)
Dept: UROLOGY | Facility: CLINIC | Age: 68
End: 2023-02-14
Payer: MEDICARE

## 2023-02-14 DIAGNOSIS — R30.0 DYSURIA: Primary | ICD-10-CM

## 2023-02-14 RX ORDER — NITROFURANTOIN 25; 75 MG/1; MG/1
100 CAPSULE ORAL 2 TIMES DAILY
Qty: 14 CAPSULE | Refills: 0 | Status: SHIPPED | OUTPATIENT
Start: 2023-02-14 | End: 2023-02-21

## 2023-02-14 NOTE — TELEPHONE ENCOUNTER
Called pt and she stated she has blood in urine as well as some back pain, she stated she feels like a possible Uti. Sched her to come in today for UA drop off. [facility]

## 2023-02-14 NOTE — PROGRESS NOTES
Notified pt of ua dropoff results. Notified pt that a urine culture was ordered and the results should be in within a few days. Pt verbalized understanding of results and instructions per the provider.   ED

## 2023-02-14 NOTE — TELEPHONE ENCOUNTER
----- Message from Ledy Mccollum sent at 2/14/2023 10:58 AM CST -----  Contact: self  Type:  Same Day Appointment Request    Caller is requesting a same day appointment.  Caller declined first available appointment listed below.    Name of Caller:Lady Arie    When is the first available appointment?03/2023  Symptoms:blood in urine  Best Call Back Number:[phone]    Additional Information: n/a

## 2023-02-17 LAB — URINE CULTURE, ROUTINE: NORMAL

## 2023-02-20 ENCOUNTER — TELEPHONE (OUTPATIENT)
Dept: UROLOGY | Facility: CLINIC | Age: 68
End: 2023-02-20
Payer: MEDICARE

## 2023-02-20 NOTE — TELEPHONE ENCOUNTER
----- Message from Weston Solis NP sent at 2/20/2023  8:27 AM CST -----  E coli on urine culture.    Continue Macrobid as directed.

## 2023-12-05 ENCOUNTER — OFFICE VISIT (OUTPATIENT)
Dept: UROLOGY | Facility: CLINIC | Age: 68
End: 2023-12-05
Payer: MEDICARE

## 2023-12-05 VITALS — HEART RATE: 78 BPM | SYSTOLIC BLOOD PRESSURE: 123 MMHG | DIASTOLIC BLOOD PRESSURE: 58 MMHG | OXYGEN SATURATION: 98 %

## 2023-12-05 DIAGNOSIS — N31.9 NEUROGENIC BLADDER: Primary | ICD-10-CM

## 2023-12-05 DIAGNOSIS — R31.0 GROSS HEMATURIA: ICD-10-CM

## 2023-12-05 DIAGNOSIS — R82.90 ABNORMAL URINALYSIS: ICD-10-CM

## 2023-12-05 LAB
BILIRUBIN, UA POC OHS: NEGATIVE
BLOOD, UA POC OHS: ABNORMAL
CLARITY, UA POC OHS: ABNORMAL
COLOR, UA POC OHS: YELLOW
GLUCOSE, UA POC OHS: NEGATIVE
KETONES, UA POC OHS: NEGATIVE
LEUKOCYTES, UA POC OHS: ABNORMAL
NITRITE, UA POC OHS: POSITIVE
PH, UA POC OHS: 6
PROTEIN, UA POC OHS: ABNORMAL
SPECIFIC GRAVITY, UA POC OHS: 1.01
UROBILINOGEN, UA POC OHS: 0.2

## 2023-12-05 PROCEDURE — 81003 URINALYSIS AUTO W/O SCOPE: CPT | Mod: QW,S$GLB,, | Performed by: NURSE PRACTITIONER

## 2023-12-05 PROCEDURE — 1159F MED LIST DOCD IN RCRD: CPT | Mod: CPTII,S$GLB,, | Performed by: NURSE PRACTITIONER

## 2023-12-05 PROCEDURE — 4010F ACE/ARB THERAPY RXD/TAKEN: CPT | Mod: CPTII,S$GLB,, | Performed by: NURSE PRACTITIONER

## 2023-12-05 PROCEDURE — 1160F PR REVIEW ALL MEDS BY PRESCRIBER/CLIN PHARMACIST DOCUMENTED: ICD-10-PCS | Mod: CPTII,S$GLB,, | Performed by: NURSE PRACTITIONER

## 2023-12-05 PROCEDURE — 99214 PR OFFICE/OUTPT VISIT, EST, LEVL IV, 30-39 MIN: ICD-10-PCS | Mod: S$GLB,,, | Performed by: NURSE PRACTITIONER

## 2023-12-05 PROCEDURE — 3074F SYST BP LT 130 MM HG: CPT | Mod: CPTII,S$GLB,, | Performed by: NURSE PRACTITIONER

## 2023-12-05 PROCEDURE — 3078F PR MOST RECENT DIASTOLIC BLOOD PRESSURE < 80 MM HG: ICD-10-PCS | Mod: CPTII,S$GLB,, | Performed by: NURSE PRACTITIONER

## 2023-12-05 PROCEDURE — 81003 POCT URINALYSIS(INSTRUMENT): ICD-10-PCS | Mod: QW,S$GLB,, | Performed by: NURSE PRACTITIONER

## 2023-12-05 PROCEDURE — 4010F PR ACE/ARB THEARPY RXD/TAKEN: ICD-10-PCS | Mod: CPTII,S$GLB,, | Performed by: NURSE PRACTITIONER

## 2023-12-05 PROCEDURE — 99214 OFFICE O/P EST MOD 30 MIN: CPT | Mod: S$GLB,,, | Performed by: NURSE PRACTITIONER

## 2023-12-05 PROCEDURE — 3074F PR MOST RECENT SYSTOLIC BLOOD PRESSURE < 130 MM HG: ICD-10-PCS | Mod: CPTII,S$GLB,, | Performed by: NURSE PRACTITIONER

## 2023-12-05 PROCEDURE — 3078F DIAST BP <80 MM HG: CPT | Mod: CPTII,S$GLB,, | Performed by: NURSE PRACTITIONER

## 2023-12-05 PROCEDURE — 1159F PR MEDICATION LIST DOCUMENTED IN MEDICAL RECORD: ICD-10-PCS | Mod: CPTII,S$GLB,, | Performed by: NURSE PRACTITIONER

## 2023-12-05 PROCEDURE — 1160F RVW MEDS BY RX/DR IN RCRD: CPT | Mod: CPTII,S$GLB,, | Performed by: NURSE PRACTITIONER

## 2023-12-05 NOTE — PROGRESS NOTES
Subjective:       Patient ID: Lady Sargent is a 68 y.o. female.    Chief Complaint: Neurogenic bladder      HPI: 68-year-old female, established patient, presents for yearly visit.    Patient has history of gross hematuria.    She is had 2 negative workups.  She would a negative workup with Dr. Granados in March 2021.  She also had negative workup with Dr. Gaviria in February 2022.   She denies any blood in her urine at this time.    She is a history of neurogenic bladder.  She was on Flomax in the past.    She stopped Flomax over year ago with no adverse effects.    Patient states he is continued to do well with no significant frequency, urgency, or leakage.      Patient was recently treated for a UTI.  She states he started having an itch.  She went to the urgent care was put on amoxicillin.  She was still having some symptoms so she was put on Cipro.    She states she took her last dose yesterday.    At this time she denies any pain or burning urination.  Denies any odor urine.  Denies any fever or body aches.    No other urinary complaints.         Past Medical History:   Past Medical History:   Diagnosis Date    Diabetes mellitus     Hypotension        Past Surgical Historical:   Past Surgical History:   Procedure Laterality Date    HYSTERECTOMY      complete        Medications:   Medication List with Changes/Refills   Current Medications    ASPIRIN 81 MG CHEW    Take 81 mg by mouth once daily.    BENZONATATE (TESSALON) 100 MG CAPSULE        CO-ENZYME Q-10 30 MG CAPSULE    Take 30 mg by mouth 3 (three) times daily.    MELOXICAM (MOBIC) 15 MG TABLET    TAKE 1 TABLET BY MOUTH ONCE DAILY WITH FOOD AS NEEDED    METFORMIN (GLUCOPHAGE) 500 MG TABLET    Take 500 mg by mouth once daily.    MONTELUKAST (SINGULAIR) 10 MG TABLET    Take 10 mg by mouth every evening.    MULTIVITAMIN CAPSULE    Take 1 capsule by mouth once daily.    ONDANSETRON (ZOFRAN-ODT) 4 MG TBDL    Take 1 tablet (4 mg total) by mouth every 6 (six)  hours as needed (nausea/vomiting).    PRAVASTATIN (PRAVACHOL) 40 MG TABLET    Take 40 mg by mouth once daily.    SERTRALINE (ZOLOFT) 50 MG TABLET    Take 50 mg by mouth once daily.    TAMSULOSIN (FLOMAX) 0.4 MG CAP    TAKE 1 CAPSULE BY MOUTH EVERYDAY AT BEDTIME        Past Social History:   Social History     Socioeconomic History    Marital status:    Tobacco Use    Smoking status: Never    Smokeless tobacco: Never   Substance and Sexual Activity    Alcohol use: Not Currently    Drug use: Never       Allergies:   Review of patient's allergies indicates:   Allergen Reactions    Latex, natural rubber Rash        Family History:   Family History   Problem Relation Age of Onset    No Known Problems Father     No Known Problems Mother     Cancer Maternal Aunt     Bone cancer Maternal Aunt     Cancer Maternal Grandmother     Bone cancer Maternal Grandmother         Review of Systems:  Review of Systems   Constitutional:  Negative for activity change and appetite change.   HENT:  Negative for congestion and dental problem.    Respiratory:  Negative for chest tightness and shortness of breath.    Cardiovascular:  Negative for chest pain.   Gastrointestinal:  Negative for abdominal distention and abdominal pain.   Genitourinary:  Negative for decreased urine volume, difficulty urinating, dyspareunia, dysuria, enuresis, flank pain, frequency, genital sores, hematuria, pelvic pain and urgency.   Musculoskeletal:  Negative for back pain and neck pain.   Allergic/Immunologic: Negative for immunocompromised state.   Neurological:  Negative for dizziness.   Hematological:  Negative for adenopathy.   Psychiatric/Behavioral:  Negative for agitation, behavioral problems and confusion.        Physical Exam:  Physical Exam  Vitals and nursing note reviewed.   Constitutional:       Appearance: She is well-developed.   HENT:      Head: Normocephalic.   Eyes:      Pupils: Pupils are equal, round, and reactive to light.    Cardiovascular:      Rate and Rhythm: Normal rate and regular rhythm.      Heart sounds: Normal heart sounds.   Pulmonary:      Effort: Pulmonary effort is normal.      Breath sounds: Normal breath sounds.   Abdominal:      General: Bowel sounds are normal.      Palpations: Abdomen is soft.   Musculoskeletal:         General: Normal range of motion.      Cervical back: Normal range of motion and neck supple.   Skin:     General: Skin is warm and dry.   Neurological:      Mental Status: She is alert and oriented to person, place, and time.   Psychiatric:         Mood and Affect: Mood normal.         Behavior: Behavior normal.       Urinalysis:  Moderate leukocytes, white blood cells 100+, epithelial +4, bacteria +4, positive nitrates.  Trace intact blood, red blood cells 2-4.    Bladder scan:  3 cc    Assessment/Plan:   1. Neurogenic bladder:  Patient is doing well no complaints.      2. Gross hematuria: Patient denies any recurrence.    UA today shows trace intact blood with red blood cells 2-4.    She is had multiple negative workups.  No intervention indicated at this time.      3. Abnormal urinalysis:  Culture the urine and will treat if indicated.    Patient completed Cipro yesterday.  She is asymptomatic at this time.      Will plan follow-up in 1 year, sooner if needed.  Problem List Items Addressed This Visit          Renal/    Neurogenic bladder - Primary    Relevant Orders    POCT Urinalysis(Instrument)    POCT Bladder Scan     Other Visit Diagnoses       Gross hematuria        Relevant Orders    POCT Urinalysis(Instrument)    Abnormal urinalysis        Relevant Orders    Urine culture

## 2024-12-06 ENCOUNTER — OFFICE VISIT (OUTPATIENT)
Dept: UROLOGY | Facility: CLINIC | Age: 69
End: 2024-12-06
Payer: MEDICARE

## 2024-12-06 VITALS
HEIGHT: 65 IN | WEIGHT: 172 LBS | DIASTOLIC BLOOD PRESSURE: 84 MMHG | HEART RATE: 80 BPM | SYSTOLIC BLOOD PRESSURE: 128 MMHG | BODY MASS INDEX: 28.66 KG/M2 | OXYGEN SATURATION: 96 %

## 2024-12-06 DIAGNOSIS — R82.90 ABNORMAL URINALYSIS: ICD-10-CM

## 2024-12-06 DIAGNOSIS — R30.0 DYSURIA: ICD-10-CM

## 2024-12-06 DIAGNOSIS — N31.9 NEUROGENIC BLADDER: ICD-10-CM

## 2024-12-06 DIAGNOSIS — N39.0 FREQUENT UTI: ICD-10-CM

## 2024-12-06 DIAGNOSIS — Z78.9 SELF-CATHETERIZES URINARY BLADDER: ICD-10-CM

## 2024-12-06 DIAGNOSIS — R31.0 GROSS HEMATURIA: Primary | ICD-10-CM

## 2024-12-06 LAB
BILIRUBIN, UA POC OHS: NEGATIVE
BLOOD, UA POC OHS: ABNORMAL
CLARITY, UA POC OHS: ABNORMAL
COLOR, UA POC OHS: YELLOW
GLUCOSE, UA POC OHS: NEGATIVE
KETONES, UA POC OHS: NEGATIVE
LEUKOCYTES, UA POC OHS: ABNORMAL
NITRITE, UA POC OHS: POSITIVE
PH, UA POC OHS: 6.5
PROTEIN, UA POC OHS: NEGATIVE
SPECIFIC GRAVITY, UA POC OHS: 1.01
UROBILINOGEN, UA POC OHS: 0.2

## 2024-12-06 RX ORDER — NITROFURANTOIN 25; 75 MG/1; MG/1
100 CAPSULE ORAL 2 TIMES DAILY
Qty: 14 CAPSULE | Refills: 0 | Status: SHIPPED | OUTPATIENT
Start: 2024-12-06 | End: 2024-12-13

## 2024-12-06 NOTE — PROGRESS NOTES
Subjective:       Patient ID: Lady Sargent is a 69 y.o. female.    Chief Complaint: Urinary Tract Infection      HPI: 69-year-old female, established patient, presents for yearly visit.    Patient has history of gross hematuria.    She is had 2 negative workups.  She would a negative workup with Dr. Granados in March 2021.  She also had negative workup with Dr. Gaviria in February 2022.   She does report blood in her urine about 5 days ago.  He is also having episodes of burning with urination and lower back pain.    She has a history of neurogenic bladder.  She self caths.  She self caths every 3-4 hours or about 4 times per day.  She was on Flomax at 1 time.  She is no longer on Flomax, she has been on Flomax since prior to last year's visit.    She denies any fever or body aches.  Denies any unexpected weight loss.  Denies any new onset bone pain.  No other urinary complaints at this time.         Past Medical History:   Past Medical History:   Diagnosis Date    Diabetes mellitus     Hypotension        Past Surgical Historical:   Past Surgical History:   Procedure Laterality Date    HYSTERECTOMY      complete        Medications:   Medication List with Changes/Refills   New Medications    NITROFURANTOIN, MACROCRYSTAL-MONOHYDRATE, (MACROBID) 100 MG CAPSULE    Take 1 capsule (100 mg total) by mouth 2 (two) times daily. for 7 days   Current Medications    ASPIRIN 81 MG CHEW    Take 81 mg by mouth once daily.    BENZONATATE (TESSALON) 100 MG CAPSULE        CO-ENZYME Q-10 30 MG CAPSULE    Take 30 mg by mouth 3 (three) times daily.    MELOXICAM (MOBIC) 15 MG TABLET    TAKE 1 TABLET BY MOUTH ONCE DAILY WITH FOOD AS NEEDED    METFORMIN (GLUCOPHAGE) 500 MG TABLET    Take 500 mg by mouth once daily.    MONTELUKAST (SINGULAIR) 10 MG TABLET    Take 10 mg by mouth every evening.    MULTIVITAMIN CAPSULE    Take 1 capsule by mouth once daily.    ONDANSETRON (ZOFRAN-ODT) 4 MG TBDL    Take 1 tablet (4 mg total) by mouth every 6  (six) hours as needed (nausea/vomiting).    PRAVASTATIN (PRAVACHOL) 40 MG TABLET    Take 40 mg by mouth once daily.    SERTRALINE (ZOLOFT) 50 MG TABLET    Take 50 mg by mouth once daily.    TAMSULOSIN (FLOMAX) 0.4 MG CAP    TAKE 1 CAPSULE BY MOUTH EVERYDAY AT BEDTIME        Past Social History:   Social History     Socioeconomic History    Marital status:    Tobacco Use    Smoking status: Never     Passive exposure: Never    Smokeless tobacco: Never   Substance and Sexual Activity    Alcohol use: Not Currently    Drug use: Never       Allergies:   Review of patient's allergies indicates:   Allergen Reactions    Latex, natural rubber Rash        Family History:   Family History   Problem Relation Name Age of Onset    No Known Problems Father      No Known Problems Mother      Cancer Maternal Aunt      Bone cancer Maternal Aunt      Cancer Maternal Grandmother      Bone cancer Maternal Grandmother          Review of Systems:  Review of Systems   Constitutional:  Negative for activity change and appetite change.   HENT:  Negative for congestion and dental problem.    Respiratory:  Negative for chest tightness and shortness of breath.    Cardiovascular:  Negative for chest pain.   Gastrointestinal:  Negative for abdominal distention and abdominal pain.   Genitourinary:  Positive for dysuria. Negative for decreased urine volume, difficulty urinating, dyspareunia, enuresis, flank pain, frequency, genital sores, hematuria, pelvic pain and urgency.   Musculoskeletal:  Positive for back pain. Negative for neck pain.   Allergic/Immunologic: Negative for immunocompromised state.   Neurological:  Negative for dizziness.   Hematological:  Negative for adenopathy.   Psychiatric/Behavioral:  Negative for agitation, behavioral problems and confusion.        Physical Exam:  Physical Exam  Vitals and nursing note reviewed.   Constitutional:       Appearance: She is well-developed.   HENT:      Head: Normocephalic.   Eyes:       Pupils: Pupils are equal, round, and reactive to light.   Cardiovascular:      Rate and Rhythm: Normal rate and regular rhythm.      Heart sounds: Normal heart sounds.   Pulmonary:      Effort: Pulmonary effort is normal.      Breath sounds: Normal breath sounds.   Abdominal:      General: Bowel sounds are normal.      Palpations: Abdomen is soft.   Musculoskeletal:         General: Normal range of motion.      Cervical back: Normal range of motion and neck supple.   Skin:     General: Skin is warm and dry.   Neurological:      Mental Status: She is alert and oriented to person, place, and time.   Psychiatric:         Mood and Affect: Mood normal.         Behavior: Behavior normal.       Urinalysis:  Large leukocytes, white blood cells too numerous to count, epithelial +2, bacteria +4, positive nitrates.  Trace intact blood, red blood cells 3-5.    Bladder scan: 3 cc    Assessment/Plan:   1. Neurogenic bladder/self cathing: Patient will continue self cathing every 3-4 hours or 4 times a day.    She will notify us for catheter refills.    2. Gross hematuria:  Has had 2- workups in the past.    She has noticed some recent blood in urine, which could be associated with a UTI.      3. Frequent UTIs/dysuria/abnormal urinalysis: Start the patient on Macrobid.    Will culture urine.  Will change antibiotics as indicated.      Will plan follow-up in 1 year, sooner if needed.    Problem List Items Addressed This Visit          Renal/    Neurogenic bladder    Relevant Orders    POCT Bladder Scan     Other Visit Diagnoses       Gross hematuria    -  Primary    Relevant Orders    POCT Urinalysis(Instrument)    Abnormal urinalysis        Relevant Orders    Urine culture    Frequent UTI        Relevant Medications    nitrofurantoin, macrocrystal-monohydrate, (MACROBID) 100 MG capsule    Dysuria        Relevant Medications    nitrofurantoin, macrocrystal-monohydrate, (MACROBID) 100 MG capsule    Other Relevant Orders    Urine  culture    Self-catheterizes urinary bladder

## 2024-12-13 ENCOUNTER — TELEPHONE (OUTPATIENT)
Dept: UROLOGY | Facility: CLINIC | Age: 69
End: 2024-12-13
Payer: MEDICARE

## 2024-12-13 RX ORDER — LEVOFLOXACIN 500 MG/1
500 TABLET, FILM COATED ORAL DAILY
Qty: 7 TABLET | Refills: 0 | Status: SHIPPED | OUTPATIENT
Start: 2024-12-13 | End: 2024-12-20

## 2024-12-13 NOTE — PROGRESS NOTES
Urine culture showed E coli with intermediate response to Cipro which she had completed day prior to appointment.    Levaquin sent to pharmacy

## 2024-12-13 NOTE — TELEPHONE ENCOUNTER
Found results and notified patient RX called out         ----- Message from Weston Solis NP sent at 12/13/2024  8:28 AM CST -----  Can we check into this urine culture.  ----- Message -----  From: SYSTEM  Sent: 12/13/2024   1:09 AM CST  To: [facility] Clinical Support Staff